# Patient Record
Sex: FEMALE | Race: WHITE | NOT HISPANIC OR LATINO | Employment: PART TIME | URBAN - METROPOLITAN AREA
[De-identification: names, ages, dates, MRNs, and addresses within clinical notes are randomized per-mention and may not be internally consistent; named-entity substitution may affect disease eponyms.]

---

## 2021-02-09 ENCOUNTER — OFFICE VISIT (OUTPATIENT)
Dept: FAMILY MEDICINE | Facility: CLINIC | Age: 36
End: 2021-02-09
Payer: COMMERCIAL

## 2021-02-09 VITALS
OXYGEN SATURATION: 98 % | HEIGHT: 65 IN | TEMPERATURE: 98 F | BODY MASS INDEX: 48.82 KG/M2 | DIASTOLIC BLOOD PRESSURE: 70 MMHG | WEIGHT: 293 LBS | SYSTOLIC BLOOD PRESSURE: 110 MMHG | HEART RATE: 90 BPM

## 2021-02-09 DIAGNOSIS — F41.1 GENERALIZED ANXIETY DISORDER WITH PANIC ATTACKS: Primary | ICD-10-CM

## 2021-02-09 DIAGNOSIS — F41.0 GENERALIZED ANXIETY DISORDER WITH PANIC ATTACKS: Primary | ICD-10-CM

## 2021-02-09 DIAGNOSIS — F31.61 BIPOLAR DISORDER, CURRENT EPISODE MIXED, MILD: ICD-10-CM

## 2021-02-09 PROBLEM — F33.0 MDD (MAJOR DEPRESSIVE DISORDER), RECURRENT EPISODE, MILD: Status: ACTIVE | Noted: 2019-03-29

## 2021-02-09 PROBLEM — E03.9 HYPOTHYROIDISM: Status: ACTIVE | Noted: 2019-03-29

## 2021-02-09 PROBLEM — K21.9 GASTROESOPHAGEAL REFLUX DISEASE WITHOUT ESOPHAGITIS: Status: ACTIVE | Noted: 2019-03-29

## 2021-02-09 PROBLEM — E66.01 MORBID OBESITY: Status: ACTIVE | Noted: 2019-08-13

## 2021-02-09 PROBLEM — F31.9 BIPOLAR DISORDER: Status: ACTIVE | Noted: 2019-03-29

## 2021-02-09 PROBLEM — I10 HYPERTENSION: Status: ACTIVE | Noted: 2019-03-29

## 2021-02-09 PROBLEM — G44.209 TENSION-TYPE HEADACHE: Status: ACTIVE | Noted: 2019-06-07

## 2021-02-09 PROBLEM — F63.9 IMPULSE CONTROL DISORDER: Status: ACTIVE | Noted: 2020-06-04

## 2021-02-09 PROCEDURE — 99213 PR OFFICE/OUTPT VISIT, EST, LEVL III, 20-29 MIN: ICD-10-PCS | Mod: S$GLB,,,

## 2021-02-09 PROCEDURE — 99213 OFFICE O/P EST LOW 20 MIN: CPT | Mod: S$GLB,,,

## 2021-02-09 RX ORDER — RIZATRIPTAN BENZOATE 10 MG/1
10 TABLET, ORALLY DISINTEGRATING ORAL
COMMUNITY

## 2021-02-09 RX ORDER — HYDROXYZINE HYDROCHLORIDE 50 MG/1
50 TABLET, FILM COATED ORAL 2 TIMES DAILY
COMMUNITY
Start: 2021-02-05

## 2021-02-09 RX ORDER — METOPROLOL SUCCINATE 100 MG/1
100 TABLET, EXTENDED RELEASE ORAL DAILY
COMMUNITY
Start: 2020-12-16 | End: 2021-09-03

## 2021-02-09 RX ORDER — ESCITALOPRAM OXALATE 10 MG/1
10 TABLET ORAL DAILY
COMMUNITY

## 2021-02-09 RX ORDER — GABAPENTIN 100 MG/1
100 CAPSULE ORAL 3 TIMES DAILY PRN
COMMUNITY
Start: 2020-11-13

## 2021-02-09 RX ORDER — BUSPIRONE HYDROCHLORIDE 10 MG/1
10 TABLET ORAL 3 TIMES DAILY
Qty: 90 TABLET | Refills: 3 | Status: SHIPPED | OUTPATIENT
Start: 2021-02-09

## 2021-02-09 RX ORDER — TRAZODONE HYDROCHLORIDE 100 MG/1
1 TABLET ORAL NIGHTLY
COMMUNITY

## 2021-02-09 RX ORDER — LEVOTHYROXINE SODIUM 200 UG/1
1 TABLET ORAL DAILY
COMMUNITY
End: 2021-09-17

## 2021-02-09 RX ORDER — NORGESTIMATE AND ETHINYL ESTRADIOL 7DAYSX3 28
1 KIT ORAL DAILY
COMMUNITY
Start: 2021-01-29

## 2021-02-09 RX ORDER — ARIPIPRAZOLE 10 MG/1
5 TABLET ORAL DAILY
COMMUNITY

## 2021-09-16 DIAGNOSIS — Z13.6 ENCOUNTER FOR LIPID SCREENING FOR CARDIOVASCULAR DISEASE: ICD-10-CM

## 2021-09-16 DIAGNOSIS — E03.9 HYPOTHYROIDISM, UNSPECIFIED TYPE: ICD-10-CM

## 2021-09-16 DIAGNOSIS — Z13.220 ENCOUNTER FOR LIPID SCREENING FOR CARDIOVASCULAR DISEASE: ICD-10-CM

## 2021-09-16 DIAGNOSIS — I10 ESSENTIAL HYPERTENSION: Primary | ICD-10-CM

## 2021-09-17 RX ORDER — LEVOTHYROXINE SODIUM 200 UG/1
TABLET ORAL
Qty: 30 TABLET | Refills: 0 | Status: SHIPPED | OUTPATIENT
Start: 2021-09-17

## 2024-11-01 ENCOUNTER — HOSPITAL ENCOUNTER (EMERGENCY)
Facility: HOSPITAL | Age: 39
Discharge: HOME | End: 2024-11-01
Attending: EMERGENCY MEDICINE

## 2024-11-01 VITALS
SYSTOLIC BLOOD PRESSURE: 160 MMHG | HEART RATE: 84 BPM | BODY MASS INDEX: 48.82 KG/M2 | OXYGEN SATURATION: 100 % | WEIGHT: 293 LBS | DIASTOLIC BLOOD PRESSURE: 101 MMHG | RESPIRATION RATE: 16 BRPM | TEMPERATURE: 98 F | HEIGHT: 65 IN

## 2024-11-01 DIAGNOSIS — M54.50 ACUTE LOW BACK PAIN, UNSPECIFIED BACK PAIN LATERALITY, UNSPECIFIED WHETHER SCIATICA PRESENT: Primary | ICD-10-CM

## 2024-11-01 DIAGNOSIS — S39.012A LUMBAR STRAIN, INITIAL ENCOUNTER: ICD-10-CM

## 2024-11-01 LAB
ALBUMIN SERPL BCP-MCNC: 3.5 G/DL (ref 3.5–5.2)
ALP SERPL-CCNC: 102 U/L (ref 40–150)
ALT SERPL W/O P-5'-P-CCNC: 33 U/L (ref 10–44)
ANION GAP SERPL CALC-SCNC: 11 MMOL/L (ref 8–16)
AST SERPL-CCNC: 22 U/L (ref 10–40)
B-HCG UR QL: NEGATIVE
BASOPHILS # BLD AUTO: 0.04 K/UL (ref 0–0.2)
BASOPHILS NFR BLD: 0.3 % (ref 0–1.9)
BILIRUB SERPL-MCNC: 0.2 MG/DL (ref 0.1–1)
BILIRUB UR QL STRIP: NEGATIVE
BUN SERPL-MCNC: 10 MG/DL (ref 6–20)
CALCIUM SERPL-MCNC: 9.6 MG/DL (ref 8.7–10.5)
CHLORIDE SERPL-SCNC: 105 MMOL/L (ref 95–110)
CK SERPL-CCNC: 75 U/L (ref 20–180)
CLARITY UR: CLEAR
CO2 SERPL-SCNC: 22 MMOL/L (ref 23–29)
COLOR UR: YELLOW
CREAT SERPL-MCNC: 0.8 MG/DL (ref 0.5–1.4)
CRP SERPL-MCNC: 34 MG/L (ref 0–8.2)
CTP QC/QA: YES
DIFFERENTIAL METHOD BLD: ABNORMAL
EOSINOPHIL # BLD AUTO: 0 K/UL (ref 0–0.5)
EOSINOPHIL NFR BLD: 0.2 % (ref 0–8)
ERYTHROCYTE [DISTWIDTH] IN BLOOD BY AUTOMATED COUNT: 14.1 % (ref 11.5–14.5)
EST. GFR  (NO RACE VARIABLE): >60 ML/MIN/1.73 M^2
GLUCOSE SERPL-MCNC: 142 MG/DL (ref 70–110)
GLUCOSE UR QL STRIP: NEGATIVE
HCT VFR BLD AUTO: 38.7 % (ref 37–48.5)
HCV AB SERPL QL IA: NEGATIVE
HGB BLD-MCNC: 13.3 G/DL (ref 12–16)
HGB UR QL STRIP: NEGATIVE
HIV 1+2 AB+HIV1 P24 AG SERPL QL IA: NEGATIVE
IMM GRANULOCYTES # BLD AUTO: 0.04 K/UL (ref 0–0.04)
IMM GRANULOCYTES NFR BLD AUTO: 0.3 % (ref 0–0.5)
KETONES UR QL STRIP: NEGATIVE
LACTATE SERPL-SCNC: 1 MMOL/L (ref 0.5–2.2)
LEUKOCYTE ESTERASE UR QL STRIP: NEGATIVE
LIPASE SERPL-CCNC: 15 U/L (ref 4–60)
LYMPHOCYTES # BLD AUTO: 2.7 K/UL (ref 1–4.8)
LYMPHOCYTES NFR BLD: 23.1 % (ref 18–48)
MAGNESIUM SERPL-MCNC: 1.8 MG/DL (ref 1.6–2.6)
MCH RBC QN AUTO: 30.6 PG (ref 27–31)
MCHC RBC AUTO-ENTMCNC: 34.4 G/DL (ref 32–36)
MCV RBC AUTO: 89 FL (ref 82–98)
MONOCYTES # BLD AUTO: 0.4 K/UL (ref 0.3–1)
MONOCYTES NFR BLD: 3.5 % (ref 4–15)
NEUTROPHILS # BLD AUTO: 8.4 K/UL (ref 1.8–7.7)
NEUTROPHILS NFR BLD: 72.6 % (ref 38–73)
NITRITE UR QL STRIP: NEGATIVE
NRBC BLD-RTO: 0 /100 WBC
PH UR STRIP: 6 [PH] (ref 5–8)
PLATELET # BLD AUTO: 373 K/UL (ref 150–450)
PMV BLD AUTO: 8.3 FL (ref 9.2–12.9)
POTASSIUM SERPL-SCNC: 3.6 MMOL/L (ref 3.5–5.1)
PROT SERPL-MCNC: 7.6 G/DL (ref 6–8.4)
PROT UR QL STRIP: NEGATIVE
RBC # BLD AUTO: 4.35 M/UL (ref 4–5.4)
SODIUM SERPL-SCNC: 138 MMOL/L (ref 136–145)
SP GR UR STRIP: 1.01 (ref 1–1.03)
T4 FREE SERPL-MCNC: 1.07 NG/DL (ref 0.71–1.51)
TSH SERPL DL<=0.005 MIU/L-ACNC: 4.63 UIU/ML (ref 0.4–4)
URN SPEC COLLECT METH UR: NORMAL
UROBILINOGEN UR STRIP-ACNC: NEGATIVE EU/DL
WBC # BLD AUTO: 11.57 K/UL (ref 3.9–12.7)

## 2024-11-01 PROCEDURE — 84443 ASSAY THYROID STIM HORMONE: CPT | Performed by: EMERGENCY MEDICINE

## 2024-11-01 PROCEDURE — 83690 ASSAY OF LIPASE: CPT | Performed by: EMERGENCY MEDICINE

## 2024-11-01 PROCEDURE — 86140 C-REACTIVE PROTEIN: CPT | Performed by: EMERGENCY MEDICINE

## 2024-11-01 PROCEDURE — 83735 ASSAY OF MAGNESIUM: CPT | Performed by: EMERGENCY MEDICINE

## 2024-11-01 PROCEDURE — 83605 ASSAY OF LACTIC ACID: CPT | Performed by: EMERGENCY MEDICINE

## 2024-11-01 PROCEDURE — 87040 BLOOD CULTURE FOR BACTERIA: CPT | Performed by: EMERGENCY MEDICINE

## 2024-11-01 PROCEDURE — 96376 TX/PRO/DX INJ SAME DRUG ADON: CPT

## 2024-11-01 PROCEDURE — 36415 COLL VENOUS BLD VENIPUNCTURE: CPT | Performed by: EMERGENCY MEDICINE

## 2024-11-01 PROCEDURE — 99285 EMERGENCY DEPT VISIT HI MDM: CPT | Mod: 25

## 2024-11-01 PROCEDURE — 96375 TX/PRO/DX INJ NEW DRUG ADDON: CPT

## 2024-11-01 PROCEDURE — 87389 HIV-1 AG W/HIV-1&-2 AB AG IA: CPT | Performed by: EMERGENCY MEDICINE

## 2024-11-01 PROCEDURE — 82550 ASSAY OF CK (CPK): CPT | Performed by: EMERGENCY MEDICINE

## 2024-11-01 PROCEDURE — 63600175 PHARM REV CODE 636 W HCPCS: Performed by: EMERGENCY MEDICINE

## 2024-11-01 PROCEDURE — 96374 THER/PROPH/DIAG INJ IV PUSH: CPT

## 2024-11-01 PROCEDURE — 86803 HEPATITIS C AB TEST: CPT | Performed by: EMERGENCY MEDICINE

## 2024-11-01 PROCEDURE — 80053 COMPREHEN METABOLIC PANEL: CPT | Performed by: EMERGENCY MEDICINE

## 2024-11-01 PROCEDURE — 25500020 PHARM REV CODE 255

## 2024-11-01 PROCEDURE — 85025 COMPLETE CBC W/AUTO DIFF WBC: CPT | Performed by: EMERGENCY MEDICINE

## 2024-11-01 PROCEDURE — 84439 ASSAY OF FREE THYROXINE: CPT | Performed by: EMERGENCY MEDICINE

## 2024-11-01 PROCEDURE — 81025 URINE PREGNANCY TEST: CPT | Performed by: EMERGENCY MEDICINE

## 2024-11-01 PROCEDURE — 81003 URINALYSIS AUTO W/O SCOPE: CPT | Performed by: EMERGENCY MEDICINE

## 2024-11-01 PROCEDURE — A9585 GADOBUTROL INJECTION: HCPCS

## 2024-11-01 RX ORDER — GADOBUTROL 604.72 MG/ML
INJECTION INTRAVENOUS
Status: COMPLETED
Start: 2024-11-01 | End: 2024-11-01

## 2024-11-01 RX ORDER — KETOROLAC TROMETHAMINE 10 MG/1
10 TABLET, FILM COATED ORAL EVERY 8 HOURS PRN
Qty: 9 TABLET | Refills: 0 | Status: SHIPPED | OUTPATIENT
Start: 2024-11-01

## 2024-11-01 RX ORDER — METHOCARBAMOL 500 MG/1
1000 TABLET, FILM COATED ORAL 3 TIMES DAILY
Qty: 12 TABLET | Refills: 0 | Status: SHIPPED | OUTPATIENT
Start: 2024-11-01

## 2024-11-01 RX ORDER — GADOBUTROL 604.72 MG/ML
INJECTION INTRAVENOUS
Status: DISCONTINUED
Start: 2024-11-01 | End: 2024-11-01 | Stop reason: WASHOUT

## 2024-11-01 RX ORDER — MORPHINE SULFATE 2 MG/ML
2 INJECTION, SOLUTION INTRAMUSCULAR; INTRAVENOUS
Status: COMPLETED | OUTPATIENT
Start: 2024-11-01 | End: 2024-11-01

## 2024-11-01 RX ORDER — ONDANSETRON HYDROCHLORIDE 2 MG/ML
4 INJECTION, SOLUTION INTRAVENOUS
Status: COMPLETED | OUTPATIENT
Start: 2024-11-01 | End: 2024-11-01

## 2024-11-01 RX ADMIN — MORPHINE SULFATE 2 MG: 2 INJECTION, SOLUTION INTRAMUSCULAR; INTRAVENOUS at 09:11

## 2024-11-01 RX ADMIN — MORPHINE SULFATE 2 MG: 2 INJECTION, SOLUTION INTRAMUSCULAR; INTRAVENOUS at 12:11

## 2024-11-01 RX ADMIN — ONDANSETRON 4 MG: 2 INJECTION INTRAMUSCULAR; INTRAVENOUS at 12:11

## 2024-11-01 RX ADMIN — GADOBUTROL 10 ML: 604.72 INJECTION INTRAVENOUS at 12:11

## 2024-11-01 RX ADMIN — ONDANSETRON 4 MG: 2 INJECTION INTRAMUSCULAR; INTRAVENOUS at 09:11

## 2024-11-01 NOTE — ED PROVIDER NOTES
Encounter Date: 2024       History     Chief Complaint   Patient presents with    Back Pain     X 10 days , UA done last Friday      39-year-old female presents complaining of low back pain.  The pain is located in the lower back, has not radiated into the legs but has at times radiated around into the lateral abdominal wall area.  The pain is worse with movement and better with rest although she still does have some pain at rest.  She denies any history of back problems.  She has been doing some heavy lifting prior to the onset of these symptoms several days ago.  She reports the pain has been severe at times.  She denies fever chills malaise or body aches.  She denies any saddle paresthesias, fever, weight loss, night sweats, lower extremity pain, weakness, numbness tingling paresthesias.  She denies chest pain coughing or shortness a breath.  She denies abdominal pain but reports the pain has made her nauseated at times.  Denies vomiting.  Denies diarrhea melena hematochezia or any gastrointestinal bleeding.  Denies any other problems or complaints.        Review of patient's allergies indicates:  No Known Allergies  Past Medical History:   Diagnosis Date    Bipolar disorder, unspecified     Chronic tension-type headache, not intractable     Depression, major, recurrent, mild     Essential hypertension     GERD without esophagitis     Morbid obesity      Past Surgical History:   Procedure Laterality Date     SECTION       SECTION       Family History   Problem Relation Name Age of Onset    Hypothyroidism Mother      Rheum arthritis Mother      Hypertension Father       Social History     Tobacco Use    Smoking status: Former     Types: Vaping with nicotine    Smokeless tobacco: Never   Substance Use Topics    Alcohol use: Not Currently     Review of Systems   Constitutional: Negative.  Negative for activity change, appetite change, chills, fatigue and fever.   HENT: Negative.  Negative for  congestion, sore throat and trouble swallowing.    Eyes: Negative.  Negative for photophobia, pain and visual disturbance.   Respiratory: Negative.  Negative for cough, shortness of breath and wheezing.    Cardiovascular: Negative.  Negative for chest pain, palpitations and leg swelling.   Gastrointestinal: Negative.  Negative for abdominal distention, abdominal pain (denies abdominal pain but the pain has been radiate around to the abdominal area at times.), blood in stool, constipation, diarrhea, nausea and vomiting.   Endocrine: Negative.    Genitourinary: Negative.  Negative for decreased urine volume, difficulty urinating, dysuria, flank pain, frequency, hematuria, pelvic pain, urgency, vaginal bleeding and vaginal discharge.        Denies any bladder or bowel incontinence or urinary retention type symptoms.   Musculoskeletal:  Positive for back pain and myalgias. Negative for arthralgias, neck pain and neck stiffness.   Skin: Negative.  Negative for color change, pallor and rash.   Neurological: Negative.  Negative for dizziness, syncope, facial asymmetry, speech difficulty, weakness, light-headedness, numbness and headaches.   Hematological:  Does not bruise/bleed easily.   Psychiatric/Behavioral: Negative.  Negative for confusion.    All other systems reviewed and are negative.      Physical Exam     Initial Vitals [11/01/24 0807]   BP Pulse Resp Temp SpO2   (!) 144/88 100 18 97.9 °F (36.6 °C) 98 %      MAP       --         Physical Exam    Nursing note and vitals reviewed.  Constitutional: She is cooperative. She does not appear ill. No distress.   HENT:   Head: Normocephalic and atraumatic.   Nose: Nose normal. Mouth/Throat: Uvula is midline, oropharynx is clear and moist and mucous membranes are normal. No oropharyngeal exudate, posterior oropharyngeal edema or posterior oropharyngeal erythema.   Eyes: Conjunctivae, EOM and lids are normal. Pupils are equal, round, and reactive to light. No scleral  icterus.   Neck: Trachea normal and phonation normal. Neck supple. No stridor present. No JVD present.   Normal range of motion.   Full passive range of motion without pain.     Cardiovascular:  Normal rate, regular rhythm, normal heart sounds, intact distal pulses and normal pulses.     Exam reveals no gallop, no distant heart sounds, no friction rub and no decreased pulses.       No murmur heard.  Pulmonary/Chest: Effort normal and breath sounds normal. No respiratory distress. She has no wheezes. She has no rhonchi. She has no rales.   Abdominal: Abdomen is soft. Bowel sounds are normal. She exhibits no distension and no mass. There is no abdominal tenderness.   No right CVA tenderness.  No left CVA tenderness. There is no rigidity.   Musculoskeletal:         General: No edema.      Right hand: Normal. Normal capillary refill. Normal pulse.      Left hand: Normal. Normal sensation. Normal capillary refill. Normal pulse.      Cervical back: Normal, full passive range of motion without pain, normal range of motion and neck supple. No bony tenderness. No pain with movement or spinous process tenderness. Normal range of motion.      Thoracic back: Normal. No tenderness. Normal range of motion.      Lumbar back: Tenderness and bony tenderness present. No swelling, edema, deformity, signs of trauma or lacerations. Decreased range of motion. Negative right straight leg raise test and negative left straight leg raise test.      Right lower leg: Normal. No swelling or tenderness.      Left lower leg: Normal. No swelling or tenderness.      Right foot: Normal. Normal capillary refill. Normal pulse.      Left foot: Normal. Normal capillary refill. Normal pulse.      Comments: Pulses are 2+ throughout, cap refill is less than 2 sec throughout, extremities are nontender throughout with full range of motion.  There is diffuse tenderness to palpation to the lower back including the midline area however the pain is not localized  to the midline area.  There is no edema erythema increased warmth or any skin changes or abnormalities there is no ecchymosis.  Patient appears uncomfortable with movement however.     Neurological: She is alert and oriented to person, place, and time. She has normal strength. No cranial nerve deficit or sensory deficit. Coordination and gait normal. GCS score is 15. GCS eye subscore is 4. GCS verbal subscore is 5. GCS motor subscore is 6.   No focal deficits.   Skin: Skin is warm, dry and intact. Capillary refill takes less than 2 seconds. No ecchymosis, no petechiae and no rash noted. No erythema. No pallor.   Psychiatric: She has a normal mood and affect. Her speech is normal and behavior is normal.         ED Course   Procedures  Labs Reviewed   TSH - Abnormal       Result Value    TSH 4.630 (*)    CBC W/ AUTO DIFFERENTIAL - Abnormal    WBC 11.57      RBC 4.35      Hemoglobin 13.3      Hematocrit 38.7      MCV 89      MCH 30.6      MCHC 34.4      RDW 14.1      Platelets 373      MPV 8.3 (*)     Immature Granulocytes 0.3      Gran # (ANC) 8.4 (*)     Immature Grans (Abs) 0.04      Lymph # 2.7      Mono # 0.4      Eos # 0.0      Baso # 0.04      nRBC 0      Gran % 72.6      Lymph % 23.1      Mono % 3.5 (*)     Eosinophil % 0.2      Basophil % 0.3      Differential Method Automated     COMPREHENSIVE METABOLIC PANEL - Abnormal    Sodium 138      Potassium 3.6      Chloride 105      CO2 22 (*)     Glucose 142 (*)     BUN 10      Creatinine 0.8      Calcium 9.6      Total Protein 7.6      Albumin 3.5      Total Bilirubin 0.2      Alkaline Phosphatase 102      AST 22      ALT 33      eGFR >60      Anion Gap 11     C-REACTIVE PROTEIN - Abnormal    CRP 34.0 (*)    CULTURE, BLOOD    Blood Culture, Routine No growth after 5 days.     CULTURE, BLOOD    Blood Culture, Routine No growth after 5 days.     HIV 1 / 2 ANTIBODY    HIV 1/2 Ag/Ab Negative      Narrative:     Release to patient->Immediate   HEPATITIS C ANTIBODY     Hepatitis C Ab Negative      Narrative:     Release to patient->Immediate   URINALYSIS, REFLEX TO URINE CULTURE    Specimen UA Urine, Clean Catch      Color, UA Yellow      Appearance, UA Clear      pH, UA 6.0      Specific Gravity, UA 1.015      Protein, UA Negative      Glucose, UA Negative      Ketones, UA Negative      Bilirubin (UA) Negative      Occult Blood UA Negative      Nitrite, UA Negative      Urobilinogen, UA Negative      Leukocytes, UA Negative      Narrative:     Specimen Source->Urine   LIPASE    Lipase 15     CK    CPK 75     MAGNESIUM    Magnesium 1.8     T4, FREE    Free T4 1.07     LACTIC ACID, PLASMA    Lactate (Lactic Acid) 1.0     POCT URINE PREGNANCY    POC Preg Test, Ur Negative       Acceptable Yes            Imaging Results              MRI Lumbar Spine W WO Cont (Final result)  Result time 11/01/24 13:03:34   Procedure changed from MRI Lumbar Spine With Contrast     Final result by Fausto Dasilva MD (11/01/24 13:03:34)                   Impression:      1. No significant degenerative changes of the lumbar spine or acute process.  The spinal canal and foramina are patent throughout.  No acute infectious or inflammatory process identified.      Electronically signed by: Fausto Dasilva  Date:    11/01/2024  Time:    13:03               Narrative:    EXAMINATION:  MRI LUMBAR SPINE W WO CONTRAST    CLINICAL HISTORY:  Myelopathy, acute, lumbar spine;elevated Crp, midline pain, rule out epidural abscess;    TECHNIQUE:  Multiplanar, multisequence MR images were acquired from the thoracolumbar junction to the sacrum without and with the administration of contrast.  10 cc Gadavist intravenous contrast administered.    COMPARISON:  CT of the lumbar spine 11/01/2024.    FINDINGS:  Morphology: Marrow signal within the lumbar spine is within normal limits.  Vertebral body heights are preserved.  Tiny ventral osteophyte complex at T10-T11 and small Schmorl's node along the inferior endplate  of T10.    Alignment: Within normal limits.    Cord: Normal in contour, caliber, and signal intensity.  Conus position is within normal limits.  No abnormal enhancement.    Disc levels:    T12-L1: Within normal limits.    L1-L2: Within normal limits.    L2-L3: Within normal limits.    L3-L4: Minor degenerative disc height loss and desiccation. No disc herniation. The spinal canal and foramina are patent.    L4-L5: Minor degenerative disc height loss and desiccation.  No disc herniation.  The spinal canal and foramina are patent.    L5-S1: Within normal limits.    Other: Unremarkable.                                       CT Lumbar Spine Without Contrast (Final result)  Result time 11/01/24 11:10:20      Final result by Fausto Dasilva MD (11/01/24 11:10:20)                   Impression:      1. No acute bony changes or traumatic malalignment of the cervical spine.      Electronically signed by: Fausto Dasilva  Date:    11/01/2024  Time:    11:10               Narrative:    EXAMINATION:  CT LUMBAR SPINE WITHOUT CONTRAST    CLINICAL HISTORY:  Lumbar radiculopathy, symptoms persist with conservative treatment;    TECHNIQUE:  Low-dose axial, sagittal and coronal reformations are obtained through the lumbar spine.  Contrast was not administered.    COMPARISON:  None.    FINDINGS:  Limited exam.    Bones: Vertebral body heights are preserved.  No fractures.    Alignment: Within normal limits.    Disc levels:    Mild degenerative disc height loss at L4-L5 and L5-S1.  No CT evidence of any high-grade osseous spinal canal narrowing or significant foraminal narrowing.    Soft Tissues: Within normal limits.                                       CT Renal Stone Study ABD Pelvis WO (Final result)  Result time 11/01/24 11:12:57      Final result by Yuli Del Toro MD (11/01/24 11:12:57)                   Impression:      No hydronephrosis, opaque renal or ureteral stone or ureteral obstruction    No acute  findings.      Electronically signed by: Yuli Del Toro MD  Date:    11/01/2024  Time:    11:12               Narrative:    EXAMINATION:  CT RENAL STONE STUDY ABD PELVIS WO    CLINICAL HISTORY:  Flank pain, kidney stone suspected;    TECHNIQUE:  Low dose axial images, sagittal and coronal reformations were obtained from the lung bases to the pubic symphysis.  Contrast was not administered.    COMPARISON:  None    FINDINGS:  Lung bases are essentially clear    Liver unremarkable appearance.    Spleen not enlarged.  Subcentimeter hypodensity consistent with a small cyst    Adrenal glands, pancreas are unremarkable appearance.  No calcified stones in the gallbladder or CT findings of acute cholecystitis and there is no biliary duct dilatation.  The abdominal aorta tapers without aneurysmal dilatation    Normal appearance of the appendix.  No free intraperitoneal air or fluid appreciable bowel wall thickening or inflammatory change    Reproductive organs; uterus and adnexal region unremarkable appearance for the patient's age    Tiny fat containing umbilical hernia    Kidneys, ureters, bladder; no hydronephrosis opaque renal or ureteral stone or ureteral obstruction.  6 mm hypodensity left kidney with appearance and CT numbers consistent with a cyst.  Urinary bladder mildly distended at time of the exam and as visualized is unremarkable appearance    Osseous structures show degenerative changes, SI joint sclerosis, obvious aggressive appearing osseous lesion                                       Medications   morphine injection 2 mg (2 mg Intravenous Given 11/1/24 0912)   ondansetron injection 4 mg (4 mg Intravenous Given 11/1/24 0913)   morphine injection 2 mg (2 mg Intravenous Given 11/1/24 1216)   ondansetron injection 4 mg (4 mg Intravenous Given 11/1/24 1216)   gadobutroL (GADAVIST) 10 mmol/10 mL (1 mmol/mL) injection (10 mLs  Given 11/1/24 1253)     Medical Decision Making  Emergent evaluation of a 39-year-old  female with low back pain.  Did have some midline tenderness, denies any red flag symptoms.  Had done some heavy lifting before symptom onset.  CRP noted to be elevated.  Due to this, MRI of the lumbar spine was obtained to rule out epidural abscess/diskitis.  MRI is negative.  Patient has felt much better after ED treatment and care.  Symptomatic supportive care has been discussed.  Return precautions discussed in detail and strict importance of close outpatient evaluation has been discussed and patient has been referred to primary care and Physical Medicine and Rehabilitation.  Any and all incidental findings have been discussed need for follow up regarding these findings discussed.  TSH discussed and need for very close outpatient evaluation again reiterated.    Amount and/or Complexity of Data Reviewed  Labs: ordered. Decision-making details documented in ED Course.  Radiology: ordered.    Risk  Prescription drug management.               ED Course as of 11/26/24 1952 Fri Nov 01, 2024   1125 TSH(!): 4.630 [AR]   1125 Sodium: 138 [AR]   1125 Potassium: 3.6 [AR]   1125 Chloride: 105 [AR]   1125 CO2(!): 22 [AR]   1125 BUN: 10 [AR]   1125 Creatinine: 0.8 [AR]   1125 Calcium: 9.6 [AR]   1125 PROTEIN TOTAL: 7.6 [AR]   1125 Albumin: 3.5 [AR]   1125 BILIRUBIN TOTAL: 0.2 [AR]   1125 ALP: 102 [AR]   1125 AST: 22 [AR]   1125 ALT: 33 [AR]   1125 WBC: 11.57 [AR]   1125 Hemoglobin: 13.3 [AR]   1125 Hematocrit: 38.7 [AR]   1125 Platelet Count: 373 [AR]   1125 Magnesium : 1.8 [AR]   1125 TSH(!): 4.630 [AR]   1125 CPK: 75 [AR]   1125 Lipase: 15 [AR]   1421 TSH(!): 4.630 [AR]   1421 Free T4: 1.07 [AR]   1421 Lactic Acid Level: 1.0 [AR]   1421 CPK: 75 [AR]   1421 Magnesium : 1.8 [AR]   1421 Lipase: 15 [AR]   1421 Hepatitis C Ab: Negative [AR]   1421 Potassium: 3.6 [AR]   1421 Chloride: 105 [AR]   1421 CO2(!): 22 [AR]   1421 Glucose(!): 142 [AR]   1421 BUN: 10 [AR]   1421 Creatinine: 0.8 [AR]   1421 Calcium: 9.6 [AR]   1421  Albumin: 3.5 [AR]   1421 ALP: 102 [AR]   1421 AST: 22 [AR]   1421 ALT: 33 [AR]   1421 WBC: 11.57 [AR]   1421 Hemoglobin: 13.3 [AR]   1421 Hematocrit: 38.7 [AR]   1422 Platelet Count: 373 [AR]   1422 hCG Qualitative, Urine: Negative [AR]      ED Course User Index  [AR] Zuri Montejo MD                           Clinical Impression:  Final diagnoses:  [M54.50] Acute low back pain, unspecified back pain laterality, unspecified whether sciatica present (Primary)  [S39.012A] Lumbar strain, initial encounter          ED Disposition Condition    Discharge Stable          ED Prescriptions       Medication Sig Dispense Start Date End Date Auth. Provider    methocarbamoL (ROBAXIN) 500 MG Tab Take 2 tablets (1,000 mg total) by mouth 3 (three) times daily. 12 tablet 11/1/2024 -- Zuri Montejo MD    ketorolac (TORADOL) 10 mg tablet Take 1 tablet (10 mg total) by mouth every 8 (eight) hours as needed for Pain. 9 tablet 11/1/2024 -- Zuri Montejo MD          Follow-up Information       Follow up With Specialties Details Why Contact Info    [facility]  Schedule an appointment as soon as possible for a visit in 3 days  [address] [place] [address]  [place] [id number]  [phone]      Ayan Altman MD Physical Medicine and Rehabilitation Schedule an appointment as soon as possible for a visit in 4 days Or see a back specialist of your choice or as recommended by your primary care provider. [address] [place] [address] [address], [address]  [place] [address]  [phone]               Zuri Montejo MD  11/26/24 1952

## 2024-11-01 NOTE — Clinical Note
"Carlota Oliveroseliecer Jimenez was seen and treated in our emergency department on 11/1/2024.  She may return to work on 11/06/2024.  If symptoms have improved     If you have any questions or concerns, please don't hesitate to call.      Zuri Montejo MD"

## 2024-11-01 NOTE — DISCHARGE INSTRUCTIONS
Please read and follow discharge instructions and return precautions.  Rest, avoid any strenuous activity, over exertion or overheating.  Keep well hydrated.  Alternate water and Pedialyte for hydration been attempt use of a heating pad.  Robaxin as directed if needed for possible muscle spasms, continue gabapentin as directed for pain and Toradol as directed for pain if pain is not controlled with Robaxin and gabapentin.  Return immediately if you develop new or worsening symptoms or if you have new problems or concerns.

## 2024-11-06 LAB
BACTERIA BLD CULT: NORMAL
BACTERIA BLD CULT: NORMAL